# Patient Record
Sex: FEMALE | Race: WHITE | NOT HISPANIC OR LATINO | Employment: FULL TIME | URBAN - METROPOLITAN AREA
[De-identification: names, ages, dates, MRNs, and addresses within clinical notes are randomized per-mention and may not be internally consistent; named-entity substitution may affect disease eponyms.]

---

## 2019-01-31 ENCOUNTER — VIRTUAL VISIT (OUTPATIENT)
Dept: FAMILY MEDICINE | Facility: OTHER | Age: 24
End: 2019-01-31

## 2019-02-01 NOTE — PROGRESS NOTES
"Date:   Clinician: Ananda Lino  Clinician NPI: [id number]  Patient: Dai Redding  Patient : 1995  Patient Address: [address] [place] [id number]  Patient Phone: [phone]  Visit Protocol: URI  Patient Summary:  Dai is a 24 year old ( : 1995 ) female who initiated a Visit for cold, sinus infection, or influenza. When asked the question \"Please sign me up to receive news, health information and promotions. \", Dai responded \"No\".    Dai states her symptoms started 1-2 days ago.   Her symptoms consist of myalgia, enlarged lymph nodes, a sore throat, and malaise.   Symptom details   Sore throat: Dai reports having moderate throat pain (4-6 on a 10 point pain scale), does not have exudate on her tonsils, and can swallow liquids. The lymph nodes in her neck are enlarged. A rash has not appeared on the skin since the sore throat started.    Dai denies having headache, ear pain, fever, facial pain or pressure, cough, chills, rhinitis, wheezing, teeth pain, and nasal congestion. She also denies taking antibiotic medication for the symptoms and having recent facial or sinus surgery in the past 60 days. She is not experiencing dyspnea.   Dai is not sure if she has been exposed to someone with strep throat. She has not recently been exposed to someone with influenza. Dai has not been in close contact with any high risk individuals.   Weight: 130 lbs   Dai does not smoke or use smokeless tobacco.   She denies pregnancy and denies breastfeeding. She has menstruated in the past month.   MEDICATIONS: No current medications, ALLERGIES: NKDA  Clinician Response:  Dear Lebron [facility] Strep Test    I am sorry you are not feeling well. Based on the information provided, it is possible you could have strep throat. When left untreated, strep can spread to other areas of the body and cause a more serious infection.  A rapid strep test is " the only way to determine if a bacterial infection or a virus is causing your sore throat. This is done in a lab where a swab of the back of your throat is collected and tested for the bacteria that causes strep.  Since you chose not to use the lab order, please be seen:     In a clinic or urgent care    Within 24 hours     Call 911 or go to the emergency room if you suddenly develop a rash, are drooling or unable to swallow fluids, if you are having difficulty breathing, or feel that your throat is closing off.   Diagnosis: [facility] Strep  Diagnosis ICD: J02.0  [facility]icket Results: [place] Strep Test - Declined  [facility]icket Secondary Results: null

## 2024-07-10 ENCOUNTER — TRANSFERRED RECORDS (OUTPATIENT)
Dept: HEALTH INFORMATION MANAGEMENT | Facility: CLINIC | Age: 29
End: 2024-07-10

## 2024-07-27 ENCOUNTER — TRANSFERRED RECORDS (OUTPATIENT)
Dept: HEALTH INFORMATION MANAGEMENT | Facility: CLINIC | Age: 29
End: 2024-07-27

## 2025-01-13 ENCOUNTER — OFFICE VISIT (OUTPATIENT)
Dept: CARDIOLOGY | Facility: CLINIC | Age: 30
End: 2025-01-13
Payer: COMMERCIAL

## 2025-01-13 ENCOUNTER — MYC MEDICAL ADVICE (OUTPATIENT)
Dept: CARDIOLOGY | Facility: CLINIC | Age: 30
End: 2025-01-13

## 2025-01-13 VITALS
BODY MASS INDEX: 20.11 KG/M2 | DIASTOLIC BLOOD PRESSURE: 52 MMHG | WEIGHT: 132.7 LBS | HEART RATE: 92 BPM | HEIGHT: 68 IN | RESPIRATION RATE: 16 BRPM | SYSTOLIC BLOOD PRESSURE: 106 MMHG

## 2025-01-13 DIAGNOSIS — I47.11 INAPPROPRIATE SINUS TACHYCARDIA: Primary | ICD-10-CM

## 2025-01-13 PROCEDURE — 99204 OFFICE O/P NEW MOD 45 MIN: CPT | Performed by: INTERNAL MEDICINE

## 2025-01-13 RX ORDER — EPINEPHRINE 0.3 MG/.3ML
0.3 INJECTION SUBCUTANEOUS PRN
COMMUNITY
Start: 2024-02-05

## 2025-01-13 RX ORDER — DAPSONE 50 MG/G
GEL TOPICAL
COMMUNITY
Start: 2025-01-07

## 2025-01-13 RX ORDER — IBUPROFEN 200 MG
200 TABLET ORAL PRN
COMMUNITY

## 2025-01-13 RX ORDER — IVABRADINE 5 MG/1
5 TABLET, FILM COATED ORAL 2 TIMES DAILY WITH MEALS
Qty: 60 TABLET | Refills: 11 | Status: SHIPPED | OUTPATIENT
Start: 2025-01-13

## 2025-01-13 RX ORDER — PROPRANOLOL HYDROCHLORIDE 10 MG/1
10 TABLET ORAL PRN
COMMUNITY
Start: 2024-08-09

## 2025-01-13 RX ORDER — TRETINOIN 0.5 MG/G
CREAM TOPICAL PRN
COMMUNITY
Start: 2025-01-06

## 2025-01-13 RX ORDER — IVABRADINE 5 MG/1
2.5 TABLET, FILM COATED ORAL 2 TIMES DAILY WITH MEALS
COMMUNITY
Start: 2025-01-01 | End: 2025-01-13

## 2025-01-13 NOTE — PROGRESS NOTES
Thank you, Dr. Ochoa, for asking the [facility] team to see Ms. Dai Redding to evaluate       Assessment/Recommendations   Assessment/Plan:  Inappropriate sinus tachycardia s/p ablation 7/2023 at [facility], unfortunately symptoms sound like return of sinus tach last fall on holter, restarted ivabradine 2.5 to 5mg bid, will see if candidate for clinical trial at [place] for ablation, otherwise will see if EP would consider ablation  Hypermobile joints but no excessively loose skin/bruising - will discuss with rheum re michaela danlos vs other.  No evidence or mention of enlarged aorta on echo from 2023 from [facility].          History of Present Illness/Subjective    Ms. 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 Coleman, off medication but symptoms returned summer 2024, restarted ivabradine two weeks ago, has been propanolol 10mg prn for palpitations prior to and after the ablation.  Last syncopal event 4-5 years ago, now she is aware and sits down to avoid syncope, symptoms diaphoresis, blurred vision, nausea, then extremely exhausted after. No cardiac congenital defects.  She has hyperrflexive joints.  Sometimes numbness in both feet prior to syncope or dizzy spells.  NO asthmas, no tob, drugs, 3 EtOH once a week. She feels better with EtOH.  No swelling in ankles, PND/orthopnea, occaisional chest pain /pressure on her chest prior to near syncope and especially after drinking EtOH next day,  Palp intermittently not associated with dizzy spells.  No fever, chills but night sweats in [place] but not now.   Last echo 2017 and at [facility] in 2023.  Holter 2024 6 days with  no exercise.   Had tilt table at [facility] and again at [facility] both considered  "abn.  No tick exposure, thyroid issues, nl thryoid issues, no DVT/PE, not on OCA.  Fingers turn white in cold weather for past 10 years.   Had mono at 17, with symptoms of syncopal events - went to physician at [facility] who diagnosed POTS/inappropriate sinus tachycardia         Physical Examination Review of Systems   /52 (BP Location: Left arm, Patient Position: Sitting, Cuff Size: Adult Regular)   Pulse 92   Resp 16   Ht 1.727 m (5' 8\")   Wt 60.2 kg (132 lb 11.2 oz)   LMP 12/23/2024   BMI 20.18 kg/m    Body mass index is 20.18 kg/m .  Wt Readings from Last 3 Encounters:   01/13/25 60.2 kg (132 lb 11.2 oz)     [unfilled]  General Appearance:   no distress, normal body habitus   ENT/Mouth: membranes moist, no oral lesions or bleeding gums.      EYES:  no scleral icterus, normal conjunctivae   Neck: no carotid bruits or thyromegaly   Chest/Lungs:   lungs are clear to auscultation, no rales or wheezing,  sternal scar, equal chest wall expansion    Cardiovascular:   Regular. Normal first and second heart sounds with no murmurs, rubs, or gallops; the carotid, radial and posterior tibial pulses are intact, Jugular venous pressure , edema bilaterally    Abdomen:  no organomegaly, masses, bruits, or tenderness; bowel sounds are present   Extremities: no cyanosis or clubbing   Skin: no xanthelasma, warm.    Neurologic: normal  bilateral, no tremors     Psychiatric: alert and oriented x3, calm     Review of Systems - 12 points nega other than above      Medical History  Surgical History Family History Social History   No past medical history on file. No past surgical history on file. No family history on file. Social History     Socioeconomic History    Marital status: Single     Spouse name: Not on file    Number of children: Not on file    Years of education: Not on file    Highest education level: Not on file   Occupational History    Not on file   Tobacco Use    Smoking status: Never    Smokeless tobacco: " "Never   Vaping Use    Vaping status: Never Used   Substance and Sexual Activity    Alcohol use: Not on file    Drug use: Never    Sexual activity: Not on file   Other Topics Concern    Not on file   Social History Narrative    Not on file     Social Drivers of Health     Financial Resource Strain: High Risk (1/1/2022)    Received from [facility] [employer] [facility]    Financial Resource Strain     Difficulty of Paying Living Expenses: Not on file     Difficulty of Paying Living Expenses: Not on file   Food Insecurity: Not on file   Transportation Needs: Not on file   Physical Activity: Not on file   Stress: Not on file   Social Connections: Unknown (1/1/2022)    Received from [facility] [employer] [facility]    Social Connections     Frequency of Communication with Friends and Family: Not on file   Interpersonal Safety: Not on file   Housing Stability: Not on file          Medications  Allergies   Scheduled Meds:  No current facility-administered medications for this visit.     Continuous Infusions:  No current facility-administered medications for this visit.     PRN Meds:.  No current facility-administered medications for this visit.    No Known Allergies      Lab Results    Chemistry/lipid CBC Cardiac Enzymes/BNP/TSH/INR   No results found for: \"CHOL\", \"HDL\", \"TRIG\", \"CHOLHDL\", \"CREATININE\", \"BUN\", \"NA\", \"CO2\" No results found for: \"WBC\", \"HGB\", \"HCT\", \"MCV\", \"PLT\" No results found for: \"CKTOTAL\", \"CKMB\", \"TROPONINI\", \"BNP\", \"TSH\", \"INR\"           Evert Akers MD  Interventional Cardiology  [facility]            "

## 2025-01-13 NOTE — LETTER
1/13/2025    Physician No Ref-Primary  No address on file    RE: Dai Redding       Dear Colleague,     I had the pleasure of seeing Dai Redding in the [facility].    Thank you, Dr. Ochoa, for asking the [facility] team to see Ms. Dai Redding to evaluate       Assessment/Recommendations   Assessment/Plan:  Inappropriate sinus tachycardia s/p ablation 7/2023 at [facility], unfortunately symptoms sound like return of sinus tach last fall on holter, restarted ivabradine 2.5 to 5mg bid, will see if candidate for clinical trial at [place] for ablation, otherwise will see if EP would consider ablation         History of Present Illness/Subjective    Ms. 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 with Dr. Coleman, off medication but symptoms returned summer 2024, restarted ivabradine two weeks ago, has been propanolol 10mg prn for palpitations prior to and after the ablation.  Last syncopal event 4-5 years ago, now she is aware and sits down to avoid syncope, symptoms diaphoresis, blurred vision, nausea, then extremely exhausted after. No cardiac congenital defects.  She has hyperrflexive joints.  Sometimes numbness in both feet prior to syncope or dizzy spells.  NO asthmas, no tob, drugs, 3 EtOH once a week. She feels better with EtOH.  No swelling in ankles, PND/orthopnea, occaisional chest pain /pressure on her chest prior to near syncope and especially after drinking EtOH next day,  Palp intermittently not associated with dizzy spells.  No fever, chills but night sweats in [place] but not now.   Last echo 2017 and at [facility] in 2023.  Holter 2024 6 days with  no exercise.   Had tilt table at [facility] and again at [facility]  "both considered abn.  No tick exposure, thyroid issues, nl thryoid issues, no DVT/PE, not on OCA.  Fingers turn white in cold weather for past 10 years.   Had mono at 17, with symptoms of syncopal events - went to physician at [facility] who diagnosed POTS/inappropriate sinus tachycardia         Physical Examination Review of Systems   /52 (BP Location: Left arm, Patient Position: Sitting, Cuff Size: Adult Regular)   Pulse 92   Resp 16   Ht 1.727 m (5' 8\")   Wt 60.2 kg (132 lb 11.2 oz)   LMP 12/23/2024   BMI 20.18 kg/m    Body mass index is 20.18 kg/m .  Wt Readings from Last 3 Encounters:   01/13/25 60.2 kg (132 lb 11.2 oz)     [unfilled]  General Appearance:   no distress, normal body habitus   ENT/Mouth: membranes moist, no oral lesions or bleeding gums.      EYES:  no scleral icterus, normal conjunctivae   Neck: no carotid bruits or thyromegaly   Chest/Lungs:   lungs are clear to auscultation, no rales or wheezing,  sternal scar, equal chest wall expansion    Cardiovascular:   Regular. Normal first and second heart sounds with no murmurs, rubs, or gallops; the carotid, radial and posterior tibial pulses are intact, Jugular venous pressure , edema bilaterally    Abdomen:  no organomegaly, masses, bruits, or tenderness; bowel sounds are present   Extremities: no cyanosis or clubbing   Skin: no xanthelasma, warm.    Neurologic: normal  bilateral, no tremors     Psychiatric: alert and oriented x3, calm     Review of Systems - 12 points nega other than above      Medical History  Surgical History Family History Social History   No past medical history on file. No past surgical history on file. No family history on file. Social History     Socioeconomic History     Marital status: Single     Spouse name: Not on file     Number of children: Not on file     Years of education: Not on file     Highest education level: Not on file   Occupational History     Not on file   Tobacco Use     Smoking status: Never     " "Smokeless tobacco: Never   Vaping Use     Vaping status: Never Used   Substance and Sexual Activity     Alcohol use: Not on file     Drug use: Never     Sexual activity: Not on file   Other Topics Concern     Not on file   Social History Narrative     Not on file     Social Drivers of Health     Financial Resource Strain: High Risk (1/1/2022)    Received from [employer] [facility]    Financial Resource Strain      Difficulty of Paying Living Expenses: Not on file      Difficulty of Paying Living Expenses: Not on file   Food Insecurity: Not on file   Transportation Needs: Not on file   Physical Activity: Not on file   Stress: Not on file   Social Connections: Unknown (1/1/2022)    Received from [employer] [facility]    Social Connections      Frequency of Communication with Friends and Family: Not on file   Interpersonal Safety: Not on file   Housing Stability: Not on file          Medications  Allergies   Scheduled Meds:  No current facility-administered medications for this visit.     Continuous Infusions:  No current facility-administered medications for this visit.     PRN Meds:.  No current facility-administered medications for this visit.    No Known Allergies      Lab Results    Chemistry/lipid CBC Cardiac Enzymes/BNP/TSH/INR   No results found for: \"CHOL\", \"HDL\", \"TRIG\", \"CHOLHDL\", \"CREATININE\", \"BUN\", \"NA\", \"CO2\" No results found for: \"WBC\", \"HGB\", \"HCT\", \"MCV\", \"PLT\" No results found for: \"CKTOTAL\", \"CKMB\", \"TROPONINI\", \"BNP\", \"TSH\", \"INR\"           Evert Akers MD  Interventional Cardiology  [facility]              Thank you for allowing me to participate in the care of your patient.      Sincerely,     Evert Akers MD     [facility]  cc:   Kody Ochoa MD  No address on file      "

## 2025-01-21 ENCOUNTER — TELEPHONE (OUTPATIENT)
Dept: CARDIOLOGY | Facility: CLINIC | Age: 30
End: 2025-01-21
Payer: COMMERCIAL

## 2025-01-21 DIAGNOSIS — I47.11 INAPPROPRIATE SINUS TACHYCARDIA: Primary | ICD-10-CM

## 2025-01-21 NOTE — TELEPHONE ENCOUNTER
From: Evert Akers MD  Sent: 1/21/2025  12:51 PM CST  To: Elisabeth Beebe    Please let her know not likely candidate for the trial given ablation in the past - they were double chcking

## 2025-01-21 NOTE — TELEPHONE ENCOUNTER
Spoke with Pt regarding recommendations, Pt in agreement. Provider to review additional mychart message received-flaco    ----- Message from Evert Akers sent at 1/21/2025 11:55 AM CST -----  Please order 48 hour holter to track HR off her medication  ----- Message -----  From: Tyrese Rivas MD  Sent: 1/20/2025   3:17 PM CST  To: MD Evert Corona  I reviewed her Holter from last summer  IST is questionable>>>she has a wide heart rate range>>>not typical for IST  Can you repeat it for 48 Hours then we can decide  She is tentatively scheduled March 5, but we will re-think need after the Holter re--do  OK?  DGB  ----- Message -----  From: Evert Akers MD  Sent: 1/13/2025   5:02 PM CST  To: Tyrese Rivas MD    Pt with IST - could you review to see if candidate for ablation again??   sent email about her.  Thanks  Evert

## 2025-01-24 ENCOUNTER — HOSPITAL ENCOUNTER (OUTPATIENT)
Dept: CARDIOLOGY | Facility: HOSPITAL | Age: 30
Discharge: HOME | End: 2025-01-24
Attending: INTERNAL MEDICINE | Admitting: INTERNAL MEDICINE
Payer: COMMERCIAL

## 2025-01-24 DIAGNOSIS — I47.11 INAPPROPRIATE SINUS TACHYCARDIA: ICD-10-CM

## 2025-01-24 PROCEDURE — 93226 XTRNL ECG REC<48 HR SCAN A/R: CPT

## 2025-01-24 PROCEDURE — 93225 XTRNL ECG REC<48 HRS REC: CPT

## 2025-01-27 PROCEDURE — 93227 XTRNL ECG REC<48 HR R&I: CPT | Performed by: INTERNAL MEDICINE

## 2025-02-04 ENCOUNTER — ORDERS ONLY (AUTO-RELEASED) (OUTPATIENT)
Dept: CARDIOLOGY | Facility: CLINIC | Age: 30
End: 2025-02-04
Payer: COMMERCIAL

## 2025-02-04 DIAGNOSIS — I47.11 INAPPROPRIATE SINUS TACHYCARDIA: Primary | ICD-10-CM

## 2025-02-04 DIAGNOSIS — I47.11 INAPPROPRIATE SINUS TACHYCARDIA: ICD-10-CM

## 2025-02-05 ENCOUNTER — TELEPHONE (OUTPATIENT)
Dept: CARDIOLOGY | Facility: CLINIC | Age: 30
End: 2025-02-05
Payer: COMMERCIAL

## 2025-02-05 NOTE — TELEPHONE ENCOUNTER
[facility] Call Center    Phone Message    May a detailed message be left on voicemail: yes     Reason for Call: Other: Pt is requesting a call back for clarification on the zio patch.  She received a text this morning that one is being shipped today but she understood that this was not needed.  Please call pt asap so the shipment can be stopped of it is not needed.     Action Taken: Other: cardio    Travel Screening: Not Applicable    Thank you!  [facility]       Date of Service:

## 2025-03-05 ENCOUNTER — APPOINTMENT (OUTPATIENT)
Dept: LAB | Facility: CLINIC | Age: 30
End: 2025-03-05
Attending: INTERNAL MEDICINE
Payer: COMMERCIAL

## 2025-03-05 ENCOUNTER — HOSPITAL ENCOUNTER (OUTPATIENT)
Facility: CLINIC | Age: 30
Discharge: HOME | End: 2025-03-05
Attending: INTERNAL MEDICINE | Admitting: INTERNAL MEDICINE
Payer: COMMERCIAL

## 2025-03-05 ENCOUNTER — APPOINTMENT (OUTPATIENT)
Dept: MEDSURG UNIT | Facility: CLINIC | Age: 30
End: 2025-03-05
Attending: INTERNAL MEDICINE
Payer: COMMERCIAL

## 2025-03-05 VITALS
OXYGEN SATURATION: 99 % | WEIGHT: 133.6 LBS | HEART RATE: 88 BPM | BODY MASS INDEX: 20.31 KG/M2 | TEMPERATURE: 98.5 F | DIASTOLIC BLOOD PRESSURE: 88 MMHG | RESPIRATION RATE: 18 BRPM | SYSTOLIC BLOOD PRESSURE: 120 MMHG

## 2025-03-05 DIAGNOSIS — I47.11 INAPPROPRIATE SINUS TACHYCARDIA: ICD-10-CM

## 2025-03-05 LAB
ANION GAP SERPL CALCULATED.3IONS-SCNC: 11 MMOL/L (ref 7–15)
ATRIAL RATE - MUSE: 89 BPM
ATRIAL RATE - MUSE: NORMAL BPM
BUN SERPL-MCNC: 12.5 MG/DL (ref 6–20)
CALCIUM SERPL-MCNC: 9.8 MG/DL (ref 8.8–10.4)
CHLORIDE SERPL-SCNC: 104 MMOL/L (ref 98–107)
CREAT SERPL-MCNC: 0.77 MG/DL (ref 0.51–0.95)
DIASTOLIC BLOOD PRESSURE - MUSE: NORMAL MMHG
DIASTOLIC BLOOD PRESSURE - MUSE: NORMAL MMHG
EGFRCR SERPLBLD CKD-EPI 2021: >90 ML/MIN/1.73M2
ERYTHROCYTE [DISTWIDTH] IN BLOOD BY AUTOMATED COUNT: 11.9 % (ref 10–15)
GLUCOSE SERPL-MCNC: 86 MG/DL (ref 70–99)
HCG INTACT+B SERPL-ACNC: <1 MIU/ML
HCO3 SERPL-SCNC: 22 MMOL/L (ref 22–29)
HCT VFR BLD AUTO: 37.8 % (ref 35–47)
HGB BLD-MCNC: 12.9 G/DL (ref 11.7–15.7)
INTERPRETATION ECG - MUSE: NORMAL
INTERPRETATION ECG - MUSE: NORMAL
MCH RBC QN AUTO: 31.7 PG (ref 26.5–33)
MCHC RBC AUTO-ENTMCNC: 34.1 G/DL (ref 31.5–36.5)
MCV RBC AUTO: 93 FL (ref 78–100)
P AXIS - MUSE: 23 DEGREES
P AXIS - MUSE: NORMAL DEGREES
PLATELET # BLD AUTO: 233 10E3/UL (ref 150–450)
POTASSIUM SERPL-SCNC: 3.7 MMOL/L (ref 3.4–5.3)
PR INTERVAL - MUSE: 134 MS
PR INTERVAL - MUSE: NORMAL MS
QRS DURATION - MUSE: 78 MS
QRS DURATION - MUSE: 82 MS
QT - MUSE: 358 MS
QT - MUSE: 378 MS
QTC - MUSE: 452 MS
QTC - MUSE: 459 MS
R AXIS - MUSE: 70 DEGREES
R AXIS - MUSE: 70 DEGREES
RBC # BLD AUTO: 4.07 10E6/UL (ref 3.8–5.2)
SODIUM SERPL-SCNC: 137 MMOL/L (ref 135–145)
SYSTOLIC BLOOD PRESSURE - MUSE: NORMAL MMHG
SYSTOLIC BLOOD PRESSURE - MUSE: NORMAL MMHG
T AXIS - MUSE: 30 DEGREES
T AXIS - MUSE: 35 DEGREES
VENTRICULAR RATE- MUSE: 89 BPM
VENTRICULAR RATE- MUSE: 96 BPM
WBC # BLD AUTO: 6.2 10E3/UL (ref 4–11)

## 2025-03-05 PROCEDURE — 36415 COLL VENOUS BLD VENIPUNCTURE: CPT

## 2025-03-05 PROCEDURE — 85014 HEMATOCRIT: CPT

## 2025-03-05 PROCEDURE — 84702 CHORIONIC GONADOTROPIN TEST: CPT | Performed by: INTERNAL MEDICINE

## 2025-03-05 PROCEDURE — 999N000054 HC STATISTIC EKG NON-CHARGEABLE

## 2025-03-05 PROCEDURE — C1732 CATH, EP, DIAG/ABL, 3D/VECT: HCPCS | Performed by: INTERNAL MEDICINE

## 2025-03-05 PROCEDURE — 250N000009 HC RX 250: Performed by: INTERNAL MEDICINE

## 2025-03-05 PROCEDURE — 250N000011 HC RX IP 250 OP 636: Performed by: INTERNAL MEDICINE

## 2025-03-05 PROCEDURE — 999N000133 HC STATISTIC PP CARE STAGE 2

## 2025-03-05 PROCEDURE — 272N000001 HC OR GENERAL SUPPLY STERILE: Performed by: INTERNAL MEDICINE

## 2025-03-05 PROCEDURE — 93005 ELECTROCARDIOGRAM TRACING: CPT

## 2025-03-05 PROCEDURE — 93653 COMPRE EP EVAL TX SVT: CPT | Mod: GC | Performed by: INTERNAL MEDICINE

## 2025-03-05 PROCEDURE — 93653 COMPRE EP EVAL TX SVT: CPT | Performed by: INTERNAL MEDICINE

## 2025-03-05 PROCEDURE — 99152 MOD SED SAME PHYS/QHP 5/>YRS: CPT | Mod: GC | Performed by: INTERNAL MEDICINE

## 2025-03-05 PROCEDURE — 99153 MOD SED SAME PHYS/QHP EA: CPT | Performed by: INTERNAL MEDICINE

## 2025-03-05 PROCEDURE — 84295 ASSAY OF SERUM SODIUM: CPT

## 2025-03-05 PROCEDURE — 999N000142 HC STATISTIC PROCEDURE PREP ONLY

## 2025-03-05 PROCEDURE — 84132 ASSAY OF SERUM POTASSIUM: CPT

## 2025-03-05 PROCEDURE — C1894 INTRO/SHEATH, NON-LASER: HCPCS | Performed by: INTERNAL MEDICINE

## 2025-03-05 PROCEDURE — C1733 CATH, EP, OTHR THAN COOL-TIP: HCPCS | Performed by: INTERNAL MEDICINE

## 2025-03-05 PROCEDURE — 99152 MOD SED SAME PHYS/QHP 5/>YRS: CPT | Performed by: INTERNAL MEDICINE

## 2025-03-05 PROCEDURE — 80048 BASIC METABOLIC PNL TOTAL CA: CPT

## 2025-03-05 PROCEDURE — C1730 CATH, EP, 19 OR FEW ELECT: HCPCS | Performed by: INTERNAL MEDICINE

## 2025-03-05 PROCEDURE — 99204 OFFICE O/P NEW MOD 45 MIN: CPT | Mod: 25 | Performed by: INTERNAL MEDICINE

## 2025-03-05 RX ORDER — FENTANYL CITRATE 50 UG/ML
25-50 INJECTION, SOLUTION INTRAMUSCULAR; INTRAVENOUS
Status: DISCONTINUED | OUTPATIENT
Start: 2025-03-05 | End: 2025-03-05 | Stop reason: HOSPADM

## 2025-03-05 RX ORDER — NALOXONE HYDROCHLORIDE 0.4 MG/ML
0.4 INJECTION, SOLUTION INTRAMUSCULAR; INTRAVENOUS; SUBCUTANEOUS
Status: DISCONTINUED | OUTPATIENT
Start: 2025-03-05 | End: 2025-03-05 | Stop reason: HOSPADM

## 2025-03-05 RX ORDER — ATROPINE SULFATE 0.1 MG/ML
INJECTION INTRAVENOUS
Status: DISCONTINUED | OUTPATIENT
Start: 2025-03-05 | End: 2025-03-05 | Stop reason: HOSPADM

## 2025-03-05 RX ORDER — ATROPINE SULFATE 0.1 MG/ML
0.5 INJECTION INTRAVENOUS EVERY 5 MIN PRN
Status: DISCONTINUED | OUTPATIENT
Start: 2025-03-05 | End: 2025-03-05 | Stop reason: HOSPADM

## 2025-03-05 RX ORDER — ONDANSETRON 2 MG/ML
INJECTION INTRAMUSCULAR; INTRAVENOUS
Status: DISCONTINUED | OUTPATIENT
Start: 2025-03-05 | End: 2025-03-05 | Stop reason: HOSPADM

## 2025-03-05 RX ORDER — FLUMAZENIL 0.1 MG/ML
0.2 INJECTION, SOLUTION INTRAVENOUS
Status: DISCONTINUED | OUTPATIENT
Start: 2025-03-05 | End: 2025-03-05 | Stop reason: HOSPADM

## 2025-03-05 RX ORDER — NALOXONE HYDROCHLORIDE 0.4 MG/ML
0.2 INJECTION, SOLUTION INTRAMUSCULAR; INTRAVENOUS; SUBCUTANEOUS
Status: DISCONTINUED | OUTPATIENT
Start: 2025-03-05 | End: 2025-03-05 | Stop reason: HOSPADM

## 2025-03-05 RX ORDER — OXYCODONE AND ACETAMINOPHEN 5; 325 MG/1; MG/1
1 TABLET ORAL EVERY 4 HOURS PRN
Status: DISCONTINUED | OUTPATIENT
Start: 2025-03-05 | End: 2025-03-05 | Stop reason: HOSPADM

## 2025-03-05 RX ORDER — LIDOCAINE 40 MG/G
CREAM TOPICAL
Status: DISCONTINUED | OUTPATIENT
Start: 2025-03-05 | End: 2025-03-05 | Stop reason: HOSPADM

## 2025-03-05 RX ORDER — FENTANYL CITRATE 50 UG/ML
INJECTION, SOLUTION INTRAMUSCULAR; INTRAVENOUS
Status: DISCONTINUED | OUTPATIENT
Start: 2025-03-05 | End: 2025-03-05 | Stop reason: HOSPADM

## 2025-03-05 ASSESSMENT — ENCOUNTER SYMPTOMS
MUSCULOSKELETAL NEGATIVE: 1
PALPITATIONS: 1
CONSTITUTIONAL NEGATIVE: 1
RESPIRATORY NEGATIVE: 1
ENDOCRINE NEGATIVE: 1

## 2025-03-05 ASSESSMENT — ACTIVITIES OF DAILY LIVING (ADL)
ADLS_ACUITY_SCORE: 43

## 2025-03-05 NOTE — DISCHARGE INSTRUCTIONS
Post-EP Comprehensive Study Discharge Instructions  After you go home:  Have an adult stay with you for 24 hours.  Drink plenty of fluids.  You may eat your normal diet, unless your doctor tells you otherwise.  For 24 hours:  Relax and take it easy.  Do NOT smoke.  Do NOT make any important or legal decisions.  Do NOT drive or operate machines at home or at work.  Do NOT drink alcohol.    Care of groin site:        Remove the Dressing after 24 hours. If there is minor oozing, apply another Band-aid and remove it after 12 hours.         Do NOT take a bath, use a hot tub, pool, or submerse in water for at least 3 days You may shower.         It is normal to have a small bruise or lump at the site.        Do not scrub the site.        Do not use lotion or powder near the puncture site for 3 days.        For the first 2 days: Do not stoop or squat. When you cough, sneeze or move your bowels, hold your hand over the puncture site and press gently.        Do not lift more than 10 pounds or exertional activity for 10 days.      If you start bleeding from the site in your groin:  Lie down flat and press firmly on the site.  Call your physician immediately, or, come to the emergency room.    Call 911 right away if you have bleeding that is heavy or does not stop.     Call your doctor/provider if:        You have a large or growing hard lump around the site.        The site is red, swollen, hot or tender.        Blood or fluid is draining from the site.        You have chills or a fever greater than 101 F (38 C).        Your leg or arm turns bluish, feels numb or cool.        You have hives, a rash or unusual itching.     Cardiovascular Clinic:   [address] [place] [address]. [place] [address]    Your Care Team:  EP Cardiology   Telephone Number     Nurses:   Fanny ZULETA [phone]    After business hours: [phone], select option 4, and ask for EP Fellow on-call to be paged.   Non-procedure  scheduling:    Sara MICHAUD   [phone]   Procedure scheduling:    Melody Germain   [phone]   Device Clinic (Pacemakers, ICDs, Loop Recorders)    During business hours: [phone]  After business hours:   [phone]- select option 4 and ask for job code [id number].       As always, Thank you for trusting us with your health care needs

## 2025-03-05 NOTE — H&P
EP pre-procedure note    HPI: Dai Redding is an 30 year old female with PMH of symptomatic IST s/p ablation 7/2023 at [facility], unfortunately symptoms sound like return of sinus tach last fall on holter, restarted ivabradine 2.5 to 5mg bid,  but she still had symptomatic IST. After discussion, we agreed to proceed with EPS and sinus node modification.     No past medical history on file.    Allergies: No Known Allergies    Active Problems:    * No active hospital problems. *    Blood pressure 122/73, pulse 89, temperature 98.5  F (36.9  C), temperature source Oral, resp. rate 16, weight 60.6 kg (133 lb 9.6 oz), last menstrual period 12/23/2024, SpO2 100%.    Review of Systems   Constitutional: Negative.    HENT: Negative.     Respiratory: Negative.     Cardiovascular:  Positive for palpitations.   Endocrine: Negative.    Genitourinary: Negative.    Musculoskeletal: Negative.    Skin: Negative.        Physical Exam  Constitutional:       Appearance: She is normal weight.   HENT:      Head: Normocephalic.      Mouth/Throat:      Mouth: Mucous membranes are moist.   Cardiovascular:      Rate and Rhythm: Regular rhythm. Tachycardia present.      Pulses: Normal pulses.      Heart sounds: Normal heart sounds.   Pulmonary:      Effort: Pulmonary effort is normal.      Breath sounds: Normal breath sounds.   Abdominal:      General: Abdomen is flat. Bowel sounds are normal.   Musculoskeletal:         General: Normal range of motion.      Cervical back: Normal range of motion.   Skin:     General: Skin is warm and dry.   Neurological:      General: No focal deficit present.      Mental Status: She is alert.         Assessment:  Dai Redding is an 30 year old female with PMH of symptomatic IST s/p ablation 7/2023 at [facility] but with persistence of symptoms. She presents today for an EPS and sinus node modification    Plan:  Proceed with EPS and sinus node modification    Giovani Vo MD  3/5/2025    I very much  appreciated the opportunity to see and assess Dai Redding in the hospital with CV Fellow Dr Vo. The note above summarizes my findings and current recommendations.  Please do not hesitate to contact my office if you have any questions or concerns.      Tyrese Rivas MD  Cardiac Arrhythmia Service  [facility]  [phone]

## 2025-03-05 NOTE — PRE-PROCEDURE
GENERAL PRE-PROCEDURE:   Procedure:  Sinus node modification  Date/Time:  3/5/2025 12:27 PM    Verbal consent obtained?: Yes    Written consent obtained?: Yes    Risks and benefits: Risks, benefits and alternatives were discussed    Consent given by:  Patient  Patient states understanding of procedure being performed: Yes    Patient's understanding of procedure matches consent: Yes    Procedure consent matches procedure scheduled: Yes    Expected level of sedation:  Moderate  Appropriately NPO:  Yes  ASA Class:  2  Mallampati  :  Grade 3- soft palate visible, posterior pharyngeal wall not visible  Lungs:  Lungs clear with good breath sounds bilaterally  Heart:  Normal heart sounds and rate  History & Physical reviewed:  History and physical reviewed and no updates needed  Statement of review:  I have reviewed the lab findings, diagnostic data, medications, and the plan for sedation

## 2025-03-05 NOTE — Clinical Note
Unable to monitor/chart End Tidal CO2 due to patient regularly mouth breathing and use of face mask

## 2025-03-05 NOTE — PROGRESS NOTES
D/I/A: Pt roomed on 2A, room 5.  Arrived via litter and accompanied by RN off monitor.  VSSA. Alert and oriented. Rhythm upon arrival: Accelerated JR on monitor-Dr Rivas aware. Denies pain or sob.  Reviewed activity restrictions and when to notify RN, ie-changes to breathing or increased chest pressure or chest pain.  CCL access: Right groin site with figure 8 stitch, site soft, dry and intact. CMS intact. Pt tolerating sips of clears. MD at bedside to update family.   P: Continue to monitor status.  Discharge to home once meeting criteria.

## 2025-03-05 NOTE — PROGRESS NOTES
Pt arrived to 2A from home for EP study. VSS. Denies pain. Consent obtained  . Lab resulted. H&P current. Allergies reviewed with pt. Pt had breakfast this morning at 0800 am .  Prep completed. Gume JORDAN) and mother Neetu at bedside; will be transporting patient to home

## 2025-03-05 NOTE — Clinical Note
dry, intact, no bleeding and no hematoma. 6 and8.5  Fr venous sheaths removed from right groin; closed by Dr Vo with figure 8 suture. Light manual pressure held until hemostasis. No bleeding, oozing, or hematoma.

## 2025-03-06 NOTE — PROGRESS NOTES
Pt completed 2 hours flat bedrest. Right groin figure 8 stitch removed and site covered with gauze and tegaderm. Site soft, dry and intact. CMS intact. Denies any pain. Bp soft-80s to 90s systolic. Pt c/o lightheadedness when standing. Dr Vo at bedside and assessed. NS bolus started. Pt encouraged to drink more fluids. Pt tolerating po intake. Discharge instructions were reviewed with pt and pt verbalizes understanding. Copy of AVS given. Pt handed off to BRYSON Knox.

## 2025-03-06 NOTE — PROGRESS NOTES
"Patient up to walk in hallways post bolus infusion. VSS /78. Patient reports,\" I feel so much better, the dizziness is gone.\" Provider notified, patient cleared for discharge per Dr. Giovani Vo. Discharge instructions reviewed with patient and family, patient up to BR, PIV discontinued. Escorted to lobby for  Services.  "

## 2025-03-07 LAB
ATRIAL RATE - MUSE: 89 BPM
ATRIAL RATE - MUSE: NORMAL BPM
DIASTOLIC BLOOD PRESSURE - MUSE: NORMAL MMHG
DIASTOLIC BLOOD PRESSURE - MUSE: NORMAL MMHG
INTERPRETATION ECG - MUSE: NORMAL
INTERPRETATION ECG - MUSE: NORMAL
P AXIS - MUSE: 23 DEGREES
P AXIS - MUSE: NORMAL DEGREES
PR INTERVAL - MUSE: 134 MS
PR INTERVAL - MUSE: NORMAL MS
QRS DURATION - MUSE: 78 MS
QRS DURATION - MUSE: 82 MS
QT - MUSE: 358 MS
QT - MUSE: 378 MS
QTC - MUSE: 452 MS
QTC - MUSE: 459 MS
R AXIS - MUSE: 70 DEGREES
R AXIS - MUSE: 70 DEGREES
SYSTOLIC BLOOD PRESSURE - MUSE: NORMAL MMHG
SYSTOLIC BLOOD PRESSURE - MUSE: NORMAL MMHG
T AXIS - MUSE: 30 DEGREES
T AXIS - MUSE: 35 DEGREES
VENTRICULAR RATE- MUSE: 89 BPM
VENTRICULAR RATE- MUSE: 96 BPM

## 2025-03-13 ENCOUNTER — TELEPHONE (OUTPATIENT)
Dept: CARDIOLOGY | Facility: CLINIC | Age: 30
End: 2025-03-13
Payer: COMMERCIAL

## 2025-03-15 ENCOUNTER — TELEPHONE (OUTPATIENT)
Dept: CARDIOLOGY | Facility: CLINIC | Age: 30
End: 2025-03-15
Payer: COMMERCIAL

## 2025-03-15 NOTE — TELEPHONE ENCOUNTER
Patient called because of fatigue and SOB.  SHe has a sinus node modification on 3/5/2025.  She did call wound last week with some concerns of palpitations and fatigue and some swinging and her heart rate.  She was recommended a Zio patch which she is wearing now day 3.  She called today because her heart rate is getting low in the 40s and she is feeling very fatigued, short of breath, and a bit dizzy.  Her blood pressure at home is in the 100s over 60s.  I mention to the patient that if her only symptom is fatigue is then I would wait it out since she likely is having some episodes of junctional beat with sinus node pauses which is sometimes expected after sinus node modification on the short run and it takes a few weeks to recover.  She then expressed that she is actually having some dizzy spells on and off when her heart rate drops to the 40s along with having shortness of breath and difficulty pulling in air especially when she is sleeping flat and she has to sleep upright.  She also mentions that her O2 sat at home on a home owned O2 sat monitor was showing high 80s low 90s.  This could also be due to a potential phrenic nerve injury.  Given all the above symptoms I instructed the patient to come in to the ED for diagnostic evaluation, that is including an EKG and a chest x-ray with full inspiration.    Sanjeev Watkins MD [facility]  Cardiology - Electrophysiology

## 2025-03-16 ENCOUNTER — HOSPITAL ENCOUNTER (EMERGENCY)
Facility: CLINIC | Age: 30
Discharge: HOME | End: 2025-03-16
Attending: EMERGENCY MEDICINE | Admitting: EMERGENCY MEDICINE
Payer: COMMERCIAL

## 2025-03-16 ENCOUNTER — APPOINTMENT (OUTPATIENT)
Dept: GENERAL RADIOLOGY | Facility: CLINIC | Age: 30
End: 2025-03-16
Attending: PHYSICIAN ASSISTANT
Payer: COMMERCIAL

## 2025-03-16 VITALS
DIASTOLIC BLOOD PRESSURE: 71 MMHG | HEART RATE: 92 BPM | OXYGEN SATURATION: 100 % | RESPIRATION RATE: 16 BRPM | SYSTOLIC BLOOD PRESSURE: 106 MMHG | TEMPERATURE: 98.8 F

## 2025-03-16 DIAGNOSIS — R00.2 PALPITATIONS: ICD-10-CM

## 2025-03-16 DIAGNOSIS — R42 DIZZINESS: ICD-10-CM

## 2025-03-16 LAB
ALBUMIN SERPL BCG-MCNC: 5 G/DL (ref 3.5–5.2)
ALP SERPL-CCNC: 42 U/L (ref 40–150)
ALT SERPL W P-5'-P-CCNC: 10 U/L (ref 0–50)
ANION GAP SERPL CALCULATED.3IONS-SCNC: 13 MMOL/L (ref 7–15)
AST SERPL W P-5'-P-CCNC: 17 U/L (ref 0–45)
ATRIAL RATE - MUSE: 75 BPM
BASOPHILS # BLD AUTO: 0.1 10E3/UL (ref 0–0.2)
BASOPHILS NFR BLD AUTO: 1 %
BILIRUB SERPL-MCNC: 0.5 MG/DL
BUN SERPL-MCNC: 9.6 MG/DL (ref 6–20)
CALCIUM SERPL-MCNC: 9.6 MG/DL (ref 8.8–10.4)
CHLORIDE SERPL-SCNC: 103 MMOL/L (ref 98–107)
CREAT SERPL-MCNC: 0.83 MG/DL (ref 0.51–0.95)
DIASTOLIC BLOOD PRESSURE - MUSE: NORMAL MMHG
EGFRCR SERPLBLD CKD-EPI 2021: >90 ML/MIN/1.73M2
EOSINOPHIL # BLD AUTO: 0.1 10E3/UL (ref 0–0.7)
EOSINOPHIL NFR BLD AUTO: 1 %
ERYTHROCYTE [DISTWIDTH] IN BLOOD BY AUTOMATED COUNT: 12 % (ref 10–15)
GLUCOSE SERPL-MCNC: 119 MG/DL (ref 70–99)
HCG SERPL QL: NEGATIVE
HCO3 SERPL-SCNC: 22 MMOL/L (ref 22–29)
HCT VFR BLD AUTO: 39 % (ref 35–47)
HGB BLD-MCNC: 13.3 G/DL (ref 11.7–15.7)
HOLD SPECIMEN: NORMAL
IMM GRANULOCYTES # BLD: 0 10E3/UL
IMM GRANULOCYTES NFR BLD: 0 %
INTERPRETATION ECG - MUSE: NORMAL
LYMPHOCYTES # BLD AUTO: 2 10E3/UL (ref 0.8–5.3)
LYMPHOCYTES NFR BLD AUTO: 28 %
MAGNESIUM SERPL-MCNC: 2 MG/DL (ref 1.7–2.3)
MCH RBC QN AUTO: 31.4 PG (ref 26.5–33)
MCHC RBC AUTO-ENTMCNC: 34.1 G/DL (ref 31.5–36.5)
MCV RBC AUTO: 92 FL (ref 78–100)
MONOCYTES # BLD AUTO: 0.5 10E3/UL (ref 0–1.3)
MONOCYTES NFR BLD AUTO: 7 %
NEUTROPHILS # BLD AUTO: 4.6 10E3/UL (ref 1.6–8.3)
NEUTROPHILS NFR BLD AUTO: 64 %
NRBC # BLD AUTO: 0 10E3/UL
NRBC BLD AUTO-RTO: 0 /100
P AXIS - MUSE: 77 DEGREES
PLATELET # BLD AUTO: 298 10E3/UL (ref 150–450)
POTASSIUM SERPL-SCNC: 3.9 MMOL/L (ref 3.4–5.3)
PR INTERVAL - MUSE: 104 MS
PROT SERPL-MCNC: 8.2 G/DL (ref 6.4–8.3)
QRS DURATION - MUSE: 78 MS
QT - MUSE: 390 MS
QTC - MUSE: 435 MS
R AXIS - MUSE: 82 DEGREES
RBC # BLD AUTO: 4.24 10E6/UL (ref 3.8–5.2)
SODIUM SERPL-SCNC: 138 MMOL/L (ref 135–145)
SYSTOLIC BLOOD PRESSURE - MUSE: NORMAL MMHG
T AXIS - MUSE: 56 DEGREES
VENTRICULAR RATE- MUSE: 75 BPM
WBC # BLD AUTO: 7.2 10E3/UL (ref 4–11)

## 2025-03-16 PROCEDURE — 71046 X-RAY EXAM CHEST 2 VIEWS: CPT | Mod: 26 | Performed by: RADIOLOGY

## 2025-03-16 PROCEDURE — 99285 EMERGENCY DEPT VISIT HI MDM: CPT | Mod: 25 | Performed by: EMERGENCY MEDICINE

## 2025-03-16 PROCEDURE — 93010 ELECTROCARDIOGRAM REPORT: CPT | Performed by: EMERGENCY MEDICINE

## 2025-03-16 PROCEDURE — 84703 CHORIONIC GONADOTROPIN ASSAY: CPT | Performed by: PHYSICIAN ASSISTANT

## 2025-03-16 PROCEDURE — 85004 AUTOMATED DIFF WBC COUNT: CPT | Performed by: PHYSICIAN ASSISTANT

## 2025-03-16 PROCEDURE — 80053 COMPREHEN METABOLIC PANEL: CPT | Performed by: PHYSICIAN ASSISTANT

## 2025-03-16 PROCEDURE — 36415 COLL VENOUS BLD VENIPUNCTURE: CPT | Performed by: PHYSICIAN ASSISTANT

## 2025-03-16 PROCEDURE — 99284 EMERGENCY DEPT VISIT MOD MDM: CPT | Performed by: EMERGENCY MEDICINE

## 2025-03-16 PROCEDURE — 71046 X-RAY EXAM CHEST 2 VIEWS: CPT

## 2025-03-16 PROCEDURE — 83735 ASSAY OF MAGNESIUM: CPT | Performed by: PHYSICIAN ASSISTANT

## 2025-03-16 PROCEDURE — 93005 ELECTROCARDIOGRAM TRACING: CPT | Performed by: EMERGENCY MEDICINE

## 2025-03-16 ASSESSMENT — ACTIVITIES OF DAILY LIVING (ADL)
ADLS_ACUITY_SCORE: 43

## 2025-03-16 NOTE — PROGRESS NOTES
"Vital signs:  Temp: 98.8  F (37.1  C) Temp src: Oral BP: 106/71 Pulse: 92   Resp: 16 SpO2: 100 % O2 Device: None (Room air)        Estimated body mass index is 20.31 kg/m  as calculated from the following:    Height as of 1/13/25: 1.727 m (5' 8\").    Weight as of 3/5/25: 60.6 kg (133 lb 9.6 oz).     Patient and parents were given a copy of discharge orders and went over any questions with them.  Patient left with parents to home with all of their belongings.  No new prescriptions or medication changes.  "

## 2025-03-16 NOTE — ED TRIAGE NOTES
Pt arrives ambulatory to the ED for lightheadedness, palpitations & mild, intermittent chest pain accompanied by nausea. Pt was directed to present to the ED after speaking with a Cardio EP provider. Pt states that 2 days ago, she also experienced some vision changes as well. Denies cough, fever, diarrhea, vomiting.       Hx: symptomatic IST s/p ablation 7/2023 at [facility], recent EPS and sinus node modification (3/5/2025)     BLANCA Grant  Shift: [id number] - [id number]

## 2025-03-16 NOTE — DISCHARGE INSTRUCTIONS
Your testing today including your cardiac monitoring has been normal.  In discussion with the cardiologist, they feel that your symptoms are normal and a part of the acute recovery phase from your recent ablation. As you'll be going home today, you should continue to wear your Zio patch, and the cardiologist's office will call you for an expedited clinic appointment for follow-up.  You should return to ER if you have any worsening symptoms of slow heartbeat, dizziness, fainting, chest pain, or shortness of breath.

## 2025-03-16 NOTE — ED PROVIDER NOTES
ED Provider Note  [facility]      History     Chief Complaint   Patient presents with    Palpitations    Shortness of Breath     HPI  31yo F pmhx symptomatic inappropriate sinus tachycardia s/p ablation (7/2023) c/b recurrence and s/p sinus node modification on 3/5/25 here with EP p/w palpitations and near syncope.  Patient reports that since procedure she has been having episodes in which she becomes bradycardic, with pulse dropping into the 40s.  Patient becomes notably symptomatic feeling dizzy/lightheaded/near syncopal.  These episodes are without clear palliating provoking factors.  She similarly says that she has had episodes in which she feels that her heart is racing and she is short of breath, though not at present.  Denies fever, chills, CP, nausea, vomiting, extremity edema, exogenous hormone use, VTE history.    Past Medical History  No past medical history on file.  Past Surgical History:   Procedure Laterality Date    EP COMPREHENSIVE EP STUDY N/A 3/5/2025    Procedure: EP Comprehensive EP Study;  Surgeon: Tyrese Rivas MD;  Location:  HEART CARDIAC CATH LAB     colchicine (COLCRYS) 0.6 MG tablet  dapsone 5 % topical gel  EPINEPHrine (ANY BX GENERIC EQUIV) 0.3 MG/0.3ML injection 2-pack  ibuprofen (ADVIL/MOTRIN) 200 MG tablet  melatonin 5 MG CAPS  metroNIDAZOLE (METROCREAM) 0.75 % external cream  propranolol (INDERAL) 10 MG tablet  tretinoin (RETIN-A) 0.05 % external cream      No Known Allergies  Family History  No family history on file.  Social History   Social History     Tobacco Use    Smoking status: Never    Smokeless tobacco: Never   Vaping Use    Vaping status: Never Used   Substance Use Topics    Alcohol use: Yes     Comment: occ    Drug use: Never      A medically appropriate review of systems was performed with pertinent positives and negatives noted in the HPI, and all other systems negative.    Physical Exam   BP: 137/80  Pulse: 92  Temp: 97.6  F (36.4   C)  Resp: 18  SpO2: 100 %/80   Pulse 92   Temp 97.6  F (36.4  C)   Resp 18   SpO2 100%   Physical Exam  Constitutional:       General: She is not in acute distress.     Appearance: Normal appearance. She is not diaphoretic.   HENT:      Head: Atraumatic.      Mouth/Throat:      Mouth: Mucous membranes are moist.   Eyes:      General: No scleral icterus.     Conjunctiva/sclera: Conjunctivae normal.   Cardiovascular:      Rate and Rhythm: Normal rate and regular rhythm.      Heart sounds: Normal heart sounds.   Pulmonary:      Effort: Pulmonary effort is normal. No respiratory distress.      Breath sounds: Normal breath sounds.   Abdominal:      General: Abdomen is flat.   Musculoskeletal:      Cervical back: Neck supple.   Skin:     General: Skin is warm.   Neurological:      Mental Status: She is alert.           ED Course, Procedures, & Data      Procedures            EKG Interpretation:      Interpreted by Nikunj Briscoe PA-C  Time reviewed: 1320  Symptoms at time of EKG: LH, palps   Rhythm: normal sinus   Rate: Normal  Axis: Normal  Ectopy: none  Conduction: Short OR (104)  ST Segments/ T Waves: No ST-T wave changes  Q Waves: none      Clinical Impression: Sinus rhythm                  Results for orders placed or performed during the hospital encounter of 03/16/25   XR Chest 2 Views     Status: None    Narrative    Exam: XR CHEST 2 VIEWS, 3/16/2025 3:25 PM    Comparison: None    History: Recent cardiac ablation. Concern for phrenic nerve  injury/diaphragmatic elevation    Findings:  Portable AP view of the chest. Cardiac silhouette is within normal  limits. No pneumothorax or pleural effusion. No acute airspace  opacity. Minimal asymmetric elevation of the right hemidiaphragm.      Impression    Impression: Minimal asymmetric elevation of the right hemidiaphragm.     I have personally reviewed the examination and initial interpretation  and I agree with the findings.    PEBBLES ANNE, DO          SYSTEM ID:  [id number]   Comprehensive metabolic panel     Status: Abnormal (Preliminary result)   Result Value Ref Range    Sodium 138 135 - 145 mmol/L    Potassium 3.9 3.4 - 5.3 mmol/L    Carbon Dioxide (CO2) 22 22 - 29 mmol/L    Anion Gap 13 7 - 15 mmol/L    Urea Nitrogen 9.6 6.0 - 20.0 mg/dL    Creatinine 0.83 0.51 - 0.95 mg/dL    GFR Estimate >90 >60 mL/min/1.73m2    Calcium      Chloride 103 98 - 107 mmol/L    Glucose 119 (H) 70 - 99 mg/dL    Alkaline Phosphatase 42 40 - 150 U/L    AST 17 0 - 45 U/L    ALT 10 0 - 50 U/L    Protein Total 8.2 6.4 - 8.3 g/dL    Albumin 5.0 3.5 - 5.2 g/dL    Bilirubin Total 0.5 <=1.2 mg/dL   Magnesium     Status: Normal   Result Value Ref Range    Magnesium 2.0 1.7 - 2.3 mg/dL   HCG qualitative pregnancy (blood)     Status: Normal   Result Value Ref Range    hCG Serum Qualitative Negative Negative   CBC with platelets and differential     Status: None   Result Value Ref Range    WBC Count 7.2 4.0 - 11.0 10e3/uL    RBC Count 4.24 3.80 - 5.20 10e6/uL    Hemoglobin 13.3 11.7 - 15.7 g/dL    Hematocrit 39.0 35.0 - 47.0 %    MCV 92 78 - 100 fL    MCH 31.4 26.5 - 33.0 pg    MCHC 34.1 31.5 - 36.5 g/dL    RDW 12.0 10.0 - 15.0 %    Platelet Count 298 150 - 450 10e3/uL    % Neutrophils 64 %    % Lymphocytes 28 %    % Monocytes 7 %    % Eosinophils 1 %    % Basophils 1 %    % Immature Granulocytes 0 %    NRBCs per 100 WBC 0 <1 /100    Absolute Neutrophils 4.6 1.6 - 8.3 10e3/uL    Absolute Lymphocytes 2.0 0.8 - 5.3 10e3/uL    Absolute Monocytes 0.5 0.0 - 1.3 10e3/uL    Absolute Eosinophils 0.1 0.0 - 0.7 10e3/uL    Absolute Basophils 0.1 0.0 - 0.2 10e3/uL    Absolute Immature Granulocytes 0.0 <=0.4 10e3/uL    Absolute NRBCs 0.0 10e3/uL   [place] Draw     Status: None    Narrative    The following orders were created for panel order [place] Draw.  Procedure                               Abnormality         Status                     ---------                               -----------          ------                     Extra Urine Collection[[id number]]                          Final result                 Please view results for these tests on the individual orders.   Extra Urine Collection     Status: None   Result Value Ref Range    Hold Specimen JIC    EKG 12-lead, tracing only     Status: None   Result Value Ref Range    Systolic Blood Pressure  mmHg    Diastolic Blood Pressure  mmHg    Ventricular Rate 75 BPM    Atrial Rate 75 BPM    NJ Interval 104 ms    QRS Duration 78 ms     ms    QTc 435 ms    P Axis 77 degrees    R AXIS 82 degrees    T Axis 56 degrees    Interpretation ECG       Sinus rhythm with short NJ  Otherwise normal ECG  Unconfirmed report - interpretation of this ECG is computer generated - see medical record for final interpretation    Confirmed by - EMERGENCY ROOM, PHYSICIAN ([id number]),  DARION MEADE ([id number]) on 3/16/2025 1:50:18 PM     CBC with platelets differential     Status: None    Narrative    The following orders were created for panel order CBC with platelets differential.  Procedure                               Abnormality         Status                     ---------                               -----------         ------                     CBC with platelets and ...[[id number]]                      Final result                 Please view results for these tests on the individual orders.     Medications - No data to display  Labs Ordered and Resulted from Time of ED Arrival to Time of ED Departure   COMPREHENSIVE METABOLIC PANEL - Abnormal       Result Value    Sodium 138      Potassium 3.9      Carbon Dioxide (CO2) 22      Anion Gap 13      Urea Nitrogen 9.6      Creatinine 0.83      GFR Estimate >90      Calcium        Chloride 103      Glucose 119 (*)     Alkaline Phosphatase 42      AST 17      ALT 10      Protein Total 8.2      Albumin 5.0      Bilirubin Total 0.5     MAGNESIUM - Normal    Magnesium 2.0     HCG QUALITATIVE PREGNANCY - Normal    hCG Serum  Qualitative Negative     CBC WITH PLATELETS AND DIFFERENTIAL    WBC Count 7.2      RBC Count 4.24      Hemoglobin 13.3      Hematocrit 39.0      MCV 92      MCH 31.4      MCHC 34.1      RDW 12.0      Platelet Count 298      % Neutrophils 64      % Lymphocytes 28      % Monocytes 7      % Eosinophils 1      % Basophils 1      % Immature Granulocytes 0      NRBCs per 100 WBC 0      Absolute Neutrophils 4.6      Absolute Lymphocytes 2.0      Absolute Monocytes 0.5      Absolute Eosinophils 0.1      Absolute Basophils 0.1      Absolute Immature Granulocytes 0.0      Absolute NRBCs 0.0       XR Chest 2 Views   Final Result   Impression: Minimal asymmetric elevation of the right hemidiaphragm.       I have personally reviewed the examination and initial interpretation   and I agree with the findings.      PEBBLES ANNE,             SYSTEM ID:  [id number]             Critical care was not performed.     Medical Decision Making  The patient's presentation was of high complexity (a chronic illness severe exacerbation, progression, or side effect of treatment).    The patient's evaluation involved:  review of external note(s) from 3+ sources (see separate area of note for details)  review of 3+ test result(s) ordered prior to this encounter (see separate area of note for details)  ordering and/or review of 3+ test(s) in this encounter (see separate area of note for details)  discussion of management or test interpretation with another health professional (see separate area of note for details)    The patient's management necessitated only low risk treatment.    Assessment & Plan    29yo F pmhx symptomatic inappropriate sinus tachycardia s/p ablation (7/2023) c/b recurrence and s/p sinus node modification on 3/5/25 here with EP p/w intermittent episodes of symptomatic bradycardia (40s) and tachycardia.  In ED patient is HDS/not tachycardic or bradycardic, afebrile, generally well-appearing.  Her EKG shows a sinus rhythm with  a rate of 75.     Spoke with the EP, who feel that these symptoms can be seen in the acute post procedure phase.  Recommended obtaining a CXR to rule out diaphragmatic elevation to suggest phrenic nerve injury, which was negative today.  Further suggested holding propranolol and ivabradine. She is currently wearing a Zio patch.  Today, EP does not feel admission indicated given her normal EKG (which they also reviewed), and will expedite follow-up for patient..  Although it is reassuring the patient is in NSR at present, given her degree of symptomatic bradycardia, did discuss with patient staying for observation on telemetry.,  Following shared decision making, however, patient would prefer to discharge home today.  Patient will be discharged from the ED at this time, with plan for expedited EP clinic follow-up. Strict ER return precautions given for worsening symptoms.      I have reviewed the nursing notes. I have reviewed the findings, diagnosis, plan and need for follow up with the patient.    New Prescriptions    No medications on file       Final diagnoses:   Palpitations   Dizziness         Nikunj Briscoe PA-C  [facility] EMERGENCY DEPARTMENT  3/16/2025     Nikunj Briscoe PA-C  03/16/25 1538       Nikunj Briscoe PA-C  03/16/25 1603

## 2025-03-18 ENCOUNTER — PATIENT OUTREACH (OUTPATIENT)
Dept: CARDIOLOGY | Facility: CLINIC | Age: 30
End: 2025-03-18
Payer: COMMERCIAL

## 2025-03-18 NOTE — PROGRESS NOTES
Reached out to patient to check in to see how she is doing after being seen in the ER on Sunday.  She reports that she had a good day yesterday and feels like she is slowly improving.  She has not been taking ivabradine or beta blockers per recommendations. She feels sometime that her heart rate drops into the 40s for short periods of time.  We will have her journal her symptoms.  She will be sending in her ZioPatch in the next several days.  We will watch for the results.    Andrew Post RN BSN  Cardiology Nurse Coordinator

## 2025-03-21 DIAGNOSIS — R07.9 CHEST PAIN: ICD-10-CM

## 2025-03-27 RX ORDER — COLCHICINE 0.6 MG/1
0.6 TABLET ORAL 2 TIMES DAILY
Qty: 180 TABLET | Refills: 1 | OUTPATIENT
Start: 2025-03-27

## 2025-04-01 LAB — CV ZIO PRELIM RESULTS: NORMAL

## 2025-04-01 PROCEDURE — 93248 EXT ECG>7D<15D REV&INTERPJ: CPT | Performed by: INTERNAL MEDICINE

## 2025-04-03 ENCOUNTER — TELEPHONE (OUTPATIENT)
Dept: CARDIOLOGY | Facility: CLINIC | Age: 30
End: 2025-04-03
Payer: COMMERCIAL

## 2025-04-03 NOTE — TELEPHONE ENCOUNTER
Called and spoke to the patient.  Let her know that per Dr. Rivas the Zio Patch looked pretty good.  There was an average heart rate of 66.  There were a few pauses up to 5 seconds that caused symptoms, but there is likely nothing to do for those.  There were some brief symptom inducing tachycardia events but short.  It is overall a better than average outcome at this point.  He will speak with her more about this next week.    Andrew Post RN BSN  Cardiology Nurse Coordinator

## 2025-04-03 NOTE — TELEPHONE ENCOUNTER
[facility] Call Center    Phone Message    May a detailed message be left on voicemail: yes     Reason for Call: Other: Carol would like a call back regarding ziopatch results. Thank you      Action Taken: Other: cardiology     Travel Screening: Not Applicable     Date of Service:

## 2025-04-10 ENCOUNTER — VIRTUAL VISIT (OUTPATIENT)
Dept: CARDIOLOGY | Facility: CLINIC | Age: 30
End: 2025-04-10
Attending: INTERNAL MEDICINE
Payer: COMMERCIAL

## 2025-04-10 VITALS — BODY MASS INDEX: 19.7 KG/M2 | WEIGHT: 130 LBS | HEIGHT: 68 IN

## 2025-04-10 DIAGNOSIS — I47.11 INAPPROPRIATE SINUS TACHYCARDIA: ICD-10-CM

## 2025-04-10 DIAGNOSIS — I49.5 SINUS NODE DYSFUNCTION (H): Primary | ICD-10-CM

## 2025-04-10 DIAGNOSIS — I45.5 SINUS PAUSE: ICD-10-CM

## 2025-04-10 RX ORDER — THEOPHYLLINE 100 MG/1
100 TABLET, EXTENDED RELEASE ORAL AT BEDTIME
Qty: 90 TABLET | Refills: 4 | Status: SHIPPED | OUTPATIENT
Start: 2025-04-10

## 2025-04-10 ASSESSMENT — PAIN SCALES - GENERAL: PAINLEVEL_OUTOF10: NO PAIN (0)

## 2025-04-10 NOTE — PROGRESS NOTES
Virtual Visit Details    Type of service:  Video Visit     HPI:   Dai is a very pleasant 30-year-old woman who recently underwent EP ablation and attempted sinus node modification.  The results have lowered her mean heart rate from greater than 100 to about 60.  However she does have some junctional escape's and has also had pauses of 3 to 5 seconds on her heart rhythm monitor.    She does have symptoms associated with the pauses most likely related to a long diastolic filling phase and major increase in cardiac output on the recovery beat.  It would be helpful to diminish her pauses if possible.  A pharmacologic approach seems reasonable to attempt.  However, I note that many of her pauses occur near bedtime or at night and drug therapy may adversely affect sleep.    I indicated to Dai that we may expect some return of sinus node function over time with fewer pauses but in the interim, since there causing her some discomfort, we will try to minimize them using a long-acting theophylline.    Depending on her response we may plan to repeat her ambulatory ECG monitor for 48 hours.  I have asked patient to contact us if she is having any excess reaction including sleep disturbances or increased sense of agitation.    PAST MEDICAL HISTORY:  No past medical history on file.    CURRENT MEDICATIONS:  Current Outpatient Medications   Medication Sig Dispense Refill    colchicine (COLCRYS) 0.6 MG tablet Take 1 tablet (0.6 mg) by mouth 2 times daily. 30 tablet 0    dapsone 5 % topical gel APPLY TOPICALLY TO THE AFFECTED AREA EVERY MORNING FOR ACNE      EPINEPHrine (ANY BX GENERIC EQUIV) 0.3 MG/0.3ML injection 2-pack Inject 0.3 mg into the muscle as needed.      ibuprofen (ADVIL/MOTRIN) 200 MG tablet Take 200 mg by mouth as needed for pain.      melatonin 5 MG CAPS Take 5 mg by mouth nightly as needed.      metroNIDAZOLE (METROCREAM) 0.75 % external cream Apply topically as needed.      propranolol (INDERAL) 10 MG  "tablet Take 10 mg by mouth as needed.      tretinoin (RETIN-A) 0.05 % external cream Apply topically as needed.         PAST SURGICAL HISTORY:  Past Surgical History:   Procedure Laterality Date    EP COMPREHENSIVE EP STUDY N/A 3/5/2025    Procedure: EP Comprehensive EP Study;  Surgeon: Tyrese Rivas MD;  Location:  HEART CARDIAC CATH LAB       ALLERGIES:   No Known Allergies    FAMILY HISTORY:  No pertinent family history    SOCIAL HISTORY:  Social History     Tobacco Use    Smoking status: Never     Passive exposure: Never    Smokeless tobacco: Never   Vaping Use    Vaping status: Never Used   Substance Use Topics    Alcohol use: Yes     Comment: occ    Drug use: Never       ROS:   Constitutional: No fever, chills, or sweats. Weight stable.   ENT: No visual disturbance, ear ache, epistaxis, sore throat.   Cardiovascular: As per HPI.   Respiratory: No cough, hemoptysis.    GI: No nausea, vomiting,.   : No hematuria.   Integument: Negative.   Psychiatric: Negative.   Hematologic:  no easy bleeding.  Neuro: Negative.   Endocrinology: No significant heat or cold intolerance   Musculoskeletal: No myalgia.    Exam:  Ht 1.727 m (5' 8\")   Wt 59 kg (130 lb)   BMI 19.77 kg/m    GENERAL APPEARANCE: healthy, alert and no distress  HEENT: no icterus, , no central cyanosis  NECK:  JVP not elevated  RESPIRATORY:- no rales, rhonchi or wheezes, no use of accessory muscles, no retractions, respirations are unlabored, normal respiratory rate  NEURO: alert and oriented to person/place/time, normal speech, gait and affect  SKIN: no ecchymoses, no rashes    Labs:  CBC RESULTS:   Lab Results   Component Value Date    WBC 7.2 03/16/2025    RBC 4.24 03/16/2025    HGB 13.3 03/16/2025    HCT 39.0 03/16/2025    MCV 92 03/16/2025    MCH 31.4 03/16/2025    MCHC 34.1 03/16/2025    RDW 12.0 03/16/2025     03/16/2025       BMP RESULTS:  Lab Results   Component Value Date     03/16/2025    POTASSIUM 3.9 03/16/2025    " "CHLORIDE 103 03/16/2025    CO2 22 03/16/2025    ANIONGAP 13 03/16/2025     (H) 03/16/2025    BUN 9.6 03/16/2025    CR 0.83 03/16/2025    GFRESTIMATED >90 03/16/2025    MARGE 9.6 03/16/2025        INR RESULTS:  No results found for: \"INR\"    Procedures:  PULMONARY FUNCTION TESTS:        No data to display                  ECHOCARDIOGRAM:   No results found for this or any previous visit (from the past 8760 hours).      Assessment and Plan:   1.  Inappropriate sinus tachycardia status post attempted modification of the sinus node region by RF ablation resulting in improved baseline rate but tendency to sinus pauses of 3 to 5 seconds with symptoms    Plan  1.  Provide prescription for theophylline  mg tablets-1/2 tablet at bedtime-3-month supply-4 refills  2.  Patient to contact us with response to #1 at which point we may introduce 48-hour Holter monitor (to be determined)  3 follow-up video visit 2 to 3 months    Total elapsed time with chart review, clinic visit and documentation 30 minutes    Video on 3: 30 p.m.; off 3: 4 5 PM  Platform [employer]  Patient at work; clinic [facility] heart    I very much appreciated the opportunity to see and assess Dai Redding in the clinic today. Please do not hesitate to contact my office if you have any questions or concerns.      Tyrese Rivas MD  Cardiac Arrhythmia Service  [facility]  [phone]       CC      "

## 2025-04-10 NOTE — LETTER
4/10/2025      RE: Dai Redding  [address] [facility] [address]       Dear Colleague,    Thank you for the opportunity to participate in the care of your patient, Dai Redding, at the [facility] [place] [facility]. Please see a copy of my visit note below.    Virtual Visit Details    Type of service:  Video Visit     HPI:   Dai is a very pleasant 30-year-old woman who recently underwent EP ablation and attempted sinus node modification.  The results have lowered her mean heart rate from greater than 100 to about 60.  However she does have some junctional escape's and has also had pauses of 3 to 5 seconds on her heart rhythm monitor.    She does have symptoms associated with the pauses most likely related to a long diastolic filling phase and major increase in cardiac output on the recovery beat.  It would be helpful to diminish her pauses if possible.  A pharmacologic approach seems reasonable to attempt.  However, I note that many of her pauses occur near bedtime or at night and drug therapy may adversely affect sleep.    I indicated to Dai that we may expect some return of sinus node function over time with fewer pauses but in the interim, since there causing her some discomfort, we will try to minimize them using a long-acting theophylline.    Depending on her response we may plan to repeat her ambulatory ECG monitor for 48 hours.  I have asked patient to contact us if she is having any excess reaction including sleep disturbances or increased sense of agitation.    PAST MEDICAL HISTORY:  No past medical history on file.    CURRENT MEDICATIONS:  Current Outpatient Medications   Medication Sig Dispense Refill     colchicine (COLCRYS) 0.6 MG tablet Take 1 tablet (0.6 mg) by mouth 2 times daily. 30 tablet 0     dapsone 5 % topical gel APPLY TOPICALLY TO THE AFFECTED AREA EVERY MORNING FOR ACNE       EPINEPHrine (ANY  "BX GENERIC EQUIV) 0.3 MG/0.3ML injection 2-pack Inject 0.3 mg into the muscle as needed.       ibuprofen (ADVIL/MOTRIN) 200 MG tablet Take 200 mg by mouth as needed for pain.       melatonin 5 MG CAPS Take 5 mg by mouth nightly as needed.       metroNIDAZOLE (METROCREAM) 0.75 % external cream Apply topically as needed.       propranolol (INDERAL) 10 MG tablet Take 10 mg by mouth as needed.       tretinoin (RETIN-A) 0.05 % external cream Apply topically as needed.         PAST SURGICAL HISTORY:  Past Surgical History:   Procedure Laterality Date     EP COMPREHENSIVE EP STUDY N/A 3/5/2025    Procedure: EP Comprehensive EP Study;  Surgeon: Tyrese Rivas MD;  Location:  HEART CARDIAC CATH LAB       ALLERGIES:   No Known Allergies    FAMILY HISTORY:  No pertinent family history    SOCIAL HISTORY:  Social History     Tobacco Use     Smoking status: Never     Passive exposure: Never     Smokeless tobacco: Never   Vaping Use     Vaping status: Never Used   Substance Use Topics     Alcohol use: Yes     Comment: occ     Drug use: Never       ROS:   Constitutional: No fever, chills, or sweats. Weight stable.   ENT: No visual disturbance, ear ache, epistaxis, sore throat.   Cardiovascular: As per HPI.   Respiratory: No cough, hemoptysis.    GI: No nausea, vomiting,.   : No hematuria.   Integument: Negative.   Psychiatric: Negative.   Hematologic:  no easy bleeding.  Neuro: Negative.   Endocrinology: No significant heat or cold intolerance   Musculoskeletal: No myalgia.    Exam:  Ht 1.727 m (5' 8\")   Wt 59 kg (130 lb)   BMI 19.77 kg/m    GENERAL APPEARANCE: healthy, alert and no distress  HEENT: no icterus, , no central cyanosis  NECK:  JVP not elevated  RESPIRATORY:- no rales, rhonchi or wheezes, no use of accessory muscles, no retractions, respirations are unlabored, normal respiratory rate  NEURO: alert and oriented to person/place/time, normal speech, gait and affect  SKIN: no ecchymoses, no rashes    Labs:  CBC " "RESULTS:   Lab Results   Component Value Date    WBC 7.2 03/16/2025    RBC 4.24 03/16/2025    HGB 13.3 03/16/2025    HCT 39.0 03/16/2025    MCV 92 03/16/2025    MCH 31.4 03/16/2025    MCHC 34.1 03/16/2025    RDW 12.0 03/16/2025     03/16/2025       BMP RESULTS:  Lab Results   Component Value Date     03/16/2025    POTASSIUM 3.9 03/16/2025    CHLORIDE 103 03/16/2025    CO2 22 03/16/2025    ANIONGAP 13 03/16/2025     (H) 03/16/2025    BUN 9.6 03/16/2025    CR 0.83 03/16/2025    GFRESTIMATED >90 03/16/2025    MARGE 9.6 03/16/2025        INR RESULTS:  No results found for: \"INR\"    Procedures:  PULMONARY FUNCTION TESTS:        No data to display                  ECHOCARDIOGRAM:   No results found for this or any previous visit (from the past 8760 hours).      Assessment and Plan:   1.  Inappropriate sinus tachycardia status post attempted modification of the sinus node region by RF ablation resulting in improved baseline rate but tendency to sinus pauses of 3 to 5 seconds with symptoms    Plan  1.  Provide prescription for theophylline  mg tablets-1/2 tablet at bedtime-3-month supply-4 refills  2.  Patient to contact us with response to #1 at which point we may introduce 48-hour Holter monitor (to be determined)  3 follow-up video visit 2 to 3 months    Total elapsed time with chart review, clinic visit and documentation 30 minutes    Video on 3: 30 p.m.; off 3: 4 5 PM  Platform [employer]  Patient at work; clinic [facility] heart    I very much appreciated the opportunity to see and assess Dai Redding in the clinic today. Please do not hesitate to contact my office if you have any questions or concerns.      Tyrese Rivas MD  Cardiac Arrhythmia Service  [facility]  [phone]       CC        Please do not hesitate to contact me if you have any questions/concerns.     Sincerely,     Tyrese Rivas MD  "

## 2025-04-10 NOTE — PATIENT INSTRUCTIONS
Plan:   1.  Provide prescription for theophylline 100mg tab at bedtime-3-month supply-4 refills (this is a different strength than discussed at the visit, I spoke with Dr. Rivas and he is ok with this dose -Andrew)  2.  Patient to contact us with response to #1 at which point we may introduce 48-hour Holter monitor (to be determined)  3 follow-up video visit 2 to 3 months      Your Care Team:  EP Cardiology   Telephone Number     Andrew Post RN [phone]    After business hours: [phone], ask for cardiologist on-call   Non-procedure scheduling:    Sara MICHAUD   [phone]   Procedure scheduling:    Melody Germain   [phone]   Device Clinic (Pacemakers, ICDs, Loop Recorders)    During business hours: [phone]  After business hours:   [phone]- select option 4 and ask for job code [id number].       Cardiovascular Clinic:   [address] [place] [address]. [place] [address]      As always, thank you for trusting us with your health care needs!    If you have further questions, please utilize Myrio to contact us.

## 2025-04-10 NOTE — NURSING NOTE
Current patient location: [address] [facility] [address]    Is the patient currently in the state of [place]? YES    Visit mode: VIDEO    If the visit is dropped, the patient can be reconnected by:VIDEO VISIT: Send to e-mail at: [email or web address]    Will anyone else be joining the visit? NO  (If patient encounters technical issues they should call [phone]    Are changes needed to the allergy or medication list? No    Are refills needed on medications prescribed by this physician? NO    Rooming Documentation:  Questionnaire(s) completed    Reason for visit: RECHECK    Kristen KESSLERF

## 2025-04-14 ENCOUNTER — TELEPHONE (OUTPATIENT)
Dept: CARDIOLOGY | Facility: CLINIC | Age: 30
End: 2025-04-14
Payer: COMMERCIAL

## 2025-04-14 NOTE — TELEPHONE ENCOUNTER
Left Voicemail (1st Attempt) and Sent Mychart (1st Attempt) for the patient to call back and schedule the following:    Appointment type: RTN EP  Provider: YANDEL  Return date: JULY 2025 OR AFTER  Specialty phone number: [phone] OPT 1  Additional appointment(s) needed: N/A  Additonal Notes: VV FOLLOW-UP WITH YANDEL

## 2025-04-17 ENCOUNTER — TELEPHONE (OUTPATIENT)
Dept: CARDIOLOGY | Facility: CLINIC | Age: 30
End: 2025-04-17
Payer: COMMERCIAL

## 2025-04-17 NOTE — TELEPHONE ENCOUNTER
Called and spoke with the patient per Dr. Rivas's request to see how she has been doing with the theophylline.  She reports that the medication has been ordered but has not come in yet.  I asked that she reach out via [employer] after she has been on the medication for one week or sooner if there are concerns.  She agreed with the plan.    Andrew Post RN BSN  Cardiology Nurse Coordinator

## 2025-04-29 ENCOUNTER — TELEPHONE (OUTPATIENT)
Dept: CARDIOLOGY | Facility: CLINIC | Age: 30
End: 2025-04-29
Payer: COMMERCIAL

## 2025-04-29 ENCOUNTER — MYC MEDICAL ADVICE (OUTPATIENT)
Dept: CARDIOLOGY | Facility: CLINIC | Age: 30
End: 2025-04-29
Payer: COMMERCIAL

## 2025-04-29 DIAGNOSIS — I49.5 SINUS NODE DYSFUNCTION (H): Primary | ICD-10-CM

## 2025-04-29 DIAGNOSIS — R11.0 NAUSEA: Primary | ICD-10-CM

## 2025-04-29 DIAGNOSIS — I47.11 INAPPROPRIATE SINUS TACHYCARDIA: ICD-10-CM

## 2025-04-29 RX ORDER — ONDANSETRON 4 MG/1
4 TABLET, ORALLY DISINTEGRATING ORAL EVERY 8 HOURS PRN
Qty: 50 TABLET | Refills: 0 | Status: SHIPPED | OUTPATIENT
Start: 2025-04-29

## 2025-04-29 NOTE — TELEPHONE ENCOUNTER
[facility] Prior Authorization Team Request    Medication: Theo24 100 mg capsule  Dosing: one capsule daily  Qty: 90  Day Supply: 90  NDC (required for Medicaid members): [id number]    Insurance   BIN: [id number]  PCN: n/a  Grp: [id number]  ID: [id number]    CoverMyMeds Key (if applicable):     Additional documentation:       Filling Pharmacy: [facility]  Phone Number: [phone]  Contact:    Pharmacy NPI (required for Medicaid members): [id number]

## 2025-04-29 NOTE — TELEPHONE ENCOUNTER
Called and spoke to the patient.  I let her know that I spoke with the pharmacist and we sent a new script.  The plan is to have a PA for the theophylline which will be done by [facility].  She may decide to pay out of pocket as her symptoms are very bothersome.  She has also been having some nausea, so per Dr. Rivas, sent Zofran.  She will MyChart if there are additional concerns.    Andrew Post RN BSN  Cardiology Nurse Coordinator

## 2025-04-30 NOTE — TELEPHONE ENCOUNTER
PA Initiation    Medication: SHANNON-24 100 MG PO CP24  Insurance Company: Comment:  R[employer]ita [employer]  Pharmacy Filling the Rx: [facility] [place], [place] - [address] [facility] [id number]  Filling Pharmacy Phone:    Filling Pharmacy Fax:    Start Date: 4/30/2025

## 2025-05-05 NOTE — TELEPHONE ENCOUNTER
Prior Authorization Approval    Medication: SHANNON-24 100 MG PO CP24  Authorization Effective Date: 5/2/2025  Authorization Expiration Date: 5/2/2026  Approved Dose/Quantity: 30/30  Reference #: [id number]   Insurance Company: Comment:  Rafita Lennon  Expected CoPay: $    CoPay Card Available:      Financial Assistance Needed:   Which Pharmacy is filling the prescription: [facility] [place], [place] - [address] [place] [address] [phone]  Pharmacy Notified: Yes  Patient Notified: Yes

## 2025-06-09 ENCOUNTER — TELEPHONE (OUTPATIENT)
Dept: CARDIOLOGY | Facility: CLINIC | Age: 30
End: 2025-06-09
Payer: COMMERCIAL

## 2025-06-09 DIAGNOSIS — R53.83 FATIGUE, UNSPECIFIED TYPE: Primary | ICD-10-CM

## 2025-06-09 RX ORDER — DEXTROAMPHETAMINE SACCHARATE, AMPHETAMINE ASPARTATE, DEXTROAMPHETAMINE SULFATE AND AMPHETAMINE SULFATE 2.5; 2.5; 2.5; 2.5 MG/1; MG/1; MG/1; MG/1
10 TABLET ORAL 2 TIMES DAILY
Qty: 180 TABLET | Refills: 0 | Status: SHIPPED | OUTPATIENT
Start: 2025-06-09

## 2025-06-09 NOTE — TELEPHONE ENCOUNTER
Spoke with the patient.  Per Dr. Rivas, will work with Angie Miller to send Adderall XR 10mg once per day.  He would like the patient to reach out via [employer] approximately one week after starting the medication, and as long as it going well, he would like approximately a 5 day monitor (Bahman is fine) to see how she is doing. Working with Angie to get the Rx sent.    GOVIND HallN RN  Electrophysiology Nurse Coordinator

## 2025-06-11 ENCOUNTER — TELEPHONE (OUTPATIENT)
Dept: CARDIOLOGY | Facility: CLINIC | Age: 30
End: 2025-06-11
Payer: COMMERCIAL

## 2025-06-11 NOTE — TELEPHONE ENCOUNTER
Central Prior Authorization Team - Phone: [phone]     PRIOR AUTHORIZATION DENIED    Medication: AMPHETAMINE-DEXTROAMPHETAMINE 10 MG PO TABS  Insurance Company: LEEANNA Eller Ph: [phone]  Denial Date: 6/11/2025  Denial Reason(s):       Appeal Information:       Patient Notified: no  Unfortunately, we cannot call the patient with denials because we do not know what next steps the MD will take nor can we give medical advice, please notify the patient of what they are to expect for the continuation of their therapy from the provider.

## 2025-06-12 ENCOUNTER — TELEPHONE (OUTPATIENT)
Dept: CARDIOLOGY | Facility: CLINIC | Age: 30
End: 2025-06-12
Payer: COMMERCIAL

## 2025-06-12 DIAGNOSIS — I49.5 SINUS NODE DYSFUNCTION (H): Primary | ICD-10-CM

## 2025-06-12 DIAGNOSIS — R53.83 FATIGUE, UNSPECIFIED TYPE: ICD-10-CM

## 2025-06-12 DIAGNOSIS — I45.5 SINUS PAUSE: ICD-10-CM

## 2025-06-12 DIAGNOSIS — I47.11 INAPPROPRIATE SINUS TACHYCARDIA: ICD-10-CM

## 2025-06-12 RX ORDER — CILOSTAZOL 100 MG/1
100 TABLET ORAL 2 TIMES DAILY
Qty: 60 TABLET | Refills: 11 | Status: SHIPPED | OUTPATIENT
Start: 2025-06-12

## 2025-06-12 NOTE — TELEPHONE ENCOUNTER
Sent medication per DB orders.  Sent [employer] message to let pt know.    GOVIND HallN RN  Electrophysiology Nurse Coordinator

## 2025-08-02 ENCOUNTER — HEALTH MAINTENANCE LETTER (OUTPATIENT)
Age: 30
End: 2025-08-02

## 2025-08-05 ENCOUNTER — ORDERS ONLY (AUTO-RELEASED) (OUTPATIENT)
Dept: CARDIOLOGY | Facility: CLINIC | Age: 30
End: 2025-08-05
Payer: COMMERCIAL

## 2025-08-05 ENCOUNTER — CARE COORDINATION (OUTPATIENT)
Dept: CARDIOLOGY | Facility: CLINIC | Age: 30
End: 2025-08-05
Payer: COMMERCIAL

## 2025-08-05 DIAGNOSIS — I49.5 SINUS NODE DYSFUNCTION (H): Primary | ICD-10-CM

## 2025-08-05 DIAGNOSIS — I45.5 SINUS PAUSE: ICD-10-CM

## 2025-08-05 DIAGNOSIS — I49.5 SINUS NODE DYSFUNCTION (H): ICD-10-CM

## 2025-09-03 LAB — CV ZIO PRELIM RESULTS: NORMAL

## (undated) DEVICE — CATH ABLATION 8FR 115CM D-F CURVE BI-DIR QDOT MICRO D139505

## (undated) DEVICE — TUBE SET SMARKABLATE IRRIGATION

## (undated) DEVICE — INTRO CATH 12CM 8.5FR FST-CATH

## (undated) DEVICE — PACK HEART LEFT CUSTOM

## (undated) DEVICE — CATH EP RESPONSE 6FRX120CM 5MM JSN CURVE 401227

## (undated) DEVICE — PATCH CARTO 3 EXTERNAL REFERENCE 3D MAPPING CREFP6

## (undated) DEVICE — KIT MICROINTRODUCER VASCULAR VSI 4FRX0.018INX40CM 7195X

## (undated) DEVICE — INTRODUCER SHEATH FAST-CATH 6FRX12CM 406104

## (undated) DEVICE — Device

## (undated) RX ORDER — FENTANYL CITRATE 50 UG/ML
INJECTION, SOLUTION INTRAMUSCULAR; INTRAVENOUS
Status: DISPENSED
Start: 2025-03-05

## (undated) RX ORDER — ONDANSETRON 2 MG/ML
INJECTION INTRAMUSCULAR; INTRAVENOUS
Status: DISPENSED
Start: 2025-03-05

## (undated) RX ORDER — SODIUM CHLORIDE 9 MG/ML
INJECTION, SOLUTION INTRAVENOUS
Status: DISPENSED
Start: 2025-03-05